# Patient Record
Sex: FEMALE | Race: WHITE | Employment: OTHER | URBAN - NONMETROPOLITAN AREA
[De-identification: names, ages, dates, MRNs, and addresses within clinical notes are randomized per-mention and may not be internally consistent; named-entity substitution may affect disease eponyms.]

---

## 2020-01-21 ENCOUNTER — OFFICE VISIT (OUTPATIENT)
Dept: VASCULAR SURGERY | Age: 70
End: 2020-01-21
Payer: COMMERCIAL

## 2020-01-21 VITALS
DIASTOLIC BLOOD PRESSURE: 79 MMHG | HEART RATE: 67 BPM | RESPIRATION RATE: 18 BRPM | BODY MASS INDEX: 20.88 KG/M2 | WEIGHT: 133 LBS | HEIGHT: 67 IN | TEMPERATURE: 96.4 F | SYSTOLIC BLOOD PRESSURE: 148 MMHG

## 2020-01-21 PROCEDURE — 99204 OFFICE O/P NEW MOD 45 MIN: CPT | Performed by: PHYSICIAN ASSISTANT

## 2020-01-21 RX ORDER — DIAZEPAM 5 MG/1
5 TABLET ORAL EVERY 6 HOURS PRN
COMMUNITY

## 2020-01-21 RX ORDER — GABAPENTIN 600 MG/1
TABLET ORAL
COMMUNITY
Start: 2020-01-17

## 2020-01-21 RX ORDER — MULTIVIT-MIN/IRON/FOLIC ACID/K 18-600-40
2000 CAPSULE ORAL
COMMUNITY

## 2020-01-21 RX ORDER — NICOTINE POLACRILEX 4 MG/1
GUM, CHEWING ORAL
COMMUNITY

## 2020-01-21 RX ORDER — HYDROCODONE BITARTRATE AND ACETAMINOPHEN 5; 325 MG/1; MG/1
TABLET ORAL
COMMUNITY
Start: 2019-12-15

## 2020-01-21 RX ORDER — FAMOTIDINE 20 MG/1
TABLET, FILM COATED ORAL
COMMUNITY

## 2020-01-21 RX ORDER — ESTRADIOL 0.04 MG/D
PATCH TRANSDERMAL
COMMUNITY
Start: 2019-11-11

## 2020-01-21 NOTE — PROGRESS NOTES
Patient Care Team:  Joshua Isaacs MD as PCP - General (Internal Medicine)      History and Physical:    Mrs. Lomas comes in today as a self referral for evaluation of spider veins. She reports that she has had sclerotherapy twice in the past. She reports that she had a reflux study about 5 years ago that did not show any reflux in the GSV that could be ablated at that time. (I do not have the study for my viewing). She reports that her legs swell and ache at times. No History of CAD or DVT. She has not been wearing compression stockings. Differential diagnosis for leg pain includes but is not limited to: varicose veins, peripheral vascular disease, arthritic pain, DVT, lumbar disc disease, and neurogenic pain. Gisella Ken is a 78 y.o. female with the following history reviewed and recorded in [facility]: There are no active problems to display for this patient. Current Outpatient Medications   Medication Sig Dispense Refill    famotidine (PEPCID) 20 MG tablet Take by mouth      omeprazole 20 MG EC tablet Take  by mouth once per day.  estradiol (CLIMARA) 0.0375 MG/24HR APPLY 1 PATCH TO SKIN AS DIRECTED EVERY 7 DAYS.  progesterone (PROMETRIUM) 100 MG capsule TAKE 1 CAPSULE BY MOUTH EVERY DAY      HYDROcodone-acetaminophen (NORCO) 5-325 MG per tablet TAKE 1/2 TABLET BY MOUTH UP TO 6 TIMES DAILY AS NEEDED FOR PAIN *MAX 3 TABLETS IN 24 HOURS*      diazepam (VALIUM) 5 MG tablet Take 5 mg by mouth every 6 hours as needed for Anxiety.  diclofenac sodium 1 % GEL APPLY 2 GRAMS TO HANDS 4 TIMES DAILY.  gabapentin (NEURONTIN) 600 MG tablet       Cholecalciferol (VITAMIN D) 50 MCG (2000 UT) CAPS capsule Take 2,000 Units by mouth      Psyllium 0.36 g CAPS Take by mouth       No current facility-administered medications for this visit. Allergies: Patient has no known allergies. No past medical history on file.   Past Surgical History:   Procedure Laterality Date   Lauren Louis 89  BREAST BIOPSY Right 1998    CARPAL TUNNEL RELEASE Bilateral 2013    CATARACT REMOVAL WITH IMPLANT Bilateral 2017    COLONOSCOPY  2016    Dr.Claffey Sosa [facility] [address] [facility]    WRIST SURGERY Bilateral      No family history on file. Social History     Tobacco Use    Smoking status: Never Smoker    Smokeless tobacco: Never Used   Substance Use Topics    Alcohol use: Yes     Alcohol/week: 1.0 standard drinks     Types: 1 Glasses of wine per week     Comment: glas nightly with dinner       Old records obtained from the referring provider. These records have been reviewed and summarized. Review of Systems    Constitutional - no significant activity change, appetite change, or unexpected weight change. No fever or chills. No diaphoresis or significant fatigue. HENT - no significant rhinorrhea or epistaxis. No tinnitus or significant hearing loss. Eyes - no sudden vision change or amaurosis. Respiratory - no significant shortness of breath, wheezing, or stridor. No apnea, cough, or chest tightness associated with shortness of breath. Cardiovascular - no chest pain, syncope, or significant dizziness. No palpitations. No claudication. See HPI  Gastrointestinal - no abdominal swelling or pain. No blood in stool. No severe constipation, diarrhea, nausea, or vomiting. Genitourinary - No difficulty urinating, dysuria, frequency, or urgency. No flank pain or hematuria. Musculoskeletal - no back pain, gait disturbance, or myalgia. Skin - no color change, rash, pallor, or new wound. Neurologic - no dizziness, facial asymmetry, or light headedness. No seizures. No speech difficulty or lateralizing weakness. Hematologic - no easy bruising or excessive bleeding. Psychiatric - no severe anxiety or nervousness. No confusion. All other review of systems are negative.       Physical Exam    BP (!) 148/79 (Site: Left Upper Arm, Position: Sitting, Cuff Size: Medium Adult)   Pulse 67   Temp 96.4

## 2020-01-23 ENCOUNTER — TELEPHONE (OUTPATIENT)
Dept: VASCULAR SURGERY | Age: 70
End: 2020-01-23

## 2020-01-31 ENCOUNTER — HOSPITAL ENCOUNTER (OUTPATIENT)
Dept: VASCULAR LAB | Age: 70
Discharge: HOME | End: 2020-01-31
Payer: MEDICARE

## 2020-01-31 PROCEDURE — 93970 EXTREMITY STUDY: CPT

## 2020-02-03 NOTE — PROGRESS NOTES
Position: Sitting, Cuff Size: Medium Adult)   Pulse 67   Temp 96.4 °F (35.8 °C) (Temporal)   Resp 18   Ht 5' 7\" (1.702 m)   Wt 133 lb (60.3 kg)   BMI 20.83 kg/m²     Constitutional - well developed, well nourished. No diaphoresis or acute distress. HENT - head normocephalic. Right external ear canal appears normal.  Left external ear canal appears normal.  Septum appears midline. Eyes - conjunctiva normal.  EOMS normal.  No exudate. No icterus. Neck- ROM appears normal, no tracheal deviation. Cardiovascular - Regular rate and rhythm. Heart sounds are normal.  No murmur, rub, or gallop. Carotid pulses are 2+ to palpation bilaterally without bruit. Extremities - Radial and brachial pulses are 2+ to palpation bilaterally. DP and PT pulses are palpable. She has multiple spider veins noted on LE. Mild LE swelling noted. Pulmonary - effort appears normal.  No respiratory distress. Lungs - Breath sounds normal. No wheezes or rales. GI - Abdomen - soft, non tender, bowel sounds X 4 quadrants. No guarding or rebound tenderness. No distension or palpable mass. Genitourinary - deferred. Musculoskeletal - ROM appears normal.  Neurologic - alert and oriented X 3. Physiologic. Skin - warm, dry, and intact. No rash, erythema, or pallor. Psychiatric - mood, affect, and behavior appear normal.  Judgment and thought processes appear normal.      Assessment    1. Spider veins of both lower extremities    2. Leg swelling    3.  Leg pain, bilateral          Plan    Recommend Nsaids for pain control  Support hose  20-30 mmHg compression (script given)  Recommend leg elevation above the level of the heart  Good moisturization  Good skin care  Diet and exercise daily  Vein packet given to the patient  We will schedule a BLE venous scan for reflux and call her with the results and discuss possible furhter options  Refer to Dermatology for evaluation and possible Sclerotherapy    BLE venous scan for Reflux - +---------------------------------------+------+------+--------------------+       Right Mapping   +----------------------------------------------+----------+----------------+   ! Location                                      !AP Diam   ! Trans Diam      !   +----------------------------------------------+----------+----------------+   ! Sapheno Femoral Junction                      !          !6               !   +----------------------------------------------+----------+----------------+   ! GSV 2 cm Distal to Junction                   !          !4. 2             !   +----------------------------------------------+----------+----------------+   ! GSV Mid Thigh                                 !          !3. 8             !   +----------------------------------------------+----------+----------------+   ! GSV Knee                                      !          !3. 9             !   +----------------------------------------------+----------+----------------+   ! SSV High Calf                                 !          !2. 8             !   +----------------------------------------------+----------+----------------+   ! SSV Mid Calf                                  !          !3               !   +----------------------------------------------+----------+----------------+   ! SSV Ankle                                     !          !3               !   +----------------------------------------------+----------+----------------+       Left Mapping   +----------------------------------------------+----------+----------------+   ! Location                                      !AP Diam   ! Trans Diam      !   +----------------------------------------------+----------+----------------+   ! Sapheno Femoral Junction                      !          !5. 4             !   +----------------------------------------------+----------+----------------+   ! GSV 2 cm Distal to Junction                   !          !5. 3             ! +----------------------------------------------+----------+----------------+   ! GSV Mid Thigh                                 !          !4. 3             !   +----------------------------------------------+----------+----------------+   ! GSV Knee                                      !          !3. 1             !   +----------------------------------------------+----------+----------------+   ! SSV High Calf                                 !          !3. 3             !   +----------------------------------------------+----------+----------------+   ! SSV Mid Calf                                  !          !2               !   +----------------------------------------------+----------+----------------+   ! SSV Ankle                                     !          !2               !   +----------------------------------------------+----------+----------------+       Velocities are measured in cm/s ; Diameters are measured in mm       Right Lower Extremities DVT Study Measurements   Right 2D Measurements   +------------------------------------+----------+---------------+----------+   ! Location                            ! Visualized! Compressibility! Thrombosis! +------------------------------------+----------+---------------+----------+   ! Sapheno Femoral Junction            ! Yes       ! Yes            !None      !   +------------------------------------+----------+---------------+----------+   ! Common Femoral                      ! Yes       ! Yes            !None      !   +------------------------------------+----------+---------------+----------+   ! Prox Femoral                        ! Yes       ! Yes            !None      !   +------------------------------------+----------+---------------+----------+   ! Mid Femoral                         ! Yes       ! Yes            !None      !   +------------------------------------+----------+---------------+----------+   ! Dist Femoral                        ! Yes       ! Yes

## 2020-02-04 ENCOUNTER — TELEPHONE (OUTPATIENT)
Dept: VASCULAR SURGERY | Age: 70
End: 2020-02-04

## 2020-04-27 ENCOUNTER — TELEPHONE (OUTPATIENT)
Dept: VASCULAR SURGERY | Age: 70
End: 2020-04-27